# Patient Record
Sex: MALE | Race: WHITE
[De-identification: names, ages, dates, MRNs, and addresses within clinical notes are randomized per-mention and may not be internally consistent; named-entity substitution may affect disease eponyms.]

---

## 2020-07-07 ENCOUNTER — HOSPITAL ENCOUNTER (OUTPATIENT)
Dept: HOSPITAL 92 - BICCT | Age: 75
Discharge: HOME | End: 2020-07-07
Attending: INTERNAL MEDICINE
Payer: COMMERCIAL

## 2020-07-07 DIAGNOSIS — K44.9: ICD-10-CM

## 2020-07-07 DIAGNOSIS — C91.10: Primary | ICD-10-CM

## 2020-07-07 DIAGNOSIS — I70.90: ICD-10-CM

## 2020-07-07 DIAGNOSIS — R59.0: ICD-10-CM

## 2020-07-07 LAB — ESTIMATED GFR-MDRD - POC: 49

## 2020-07-07 PROCEDURE — 82565 ASSAY OF CREATININE: CPT

## 2020-07-07 PROCEDURE — 71260 CT THORAX DX C+: CPT

## 2020-07-07 PROCEDURE — 74177 CT ABD & PELVIS W/CONTRAST: CPT

## 2020-07-07 NOTE — CT
CT chest with IV contrast

CT abdomen and pelvis with IV and oral contrast



HISTORY: Chronic lymphocytic leukemia not having achieved remission. New adenopathy of the axilla and
 groin.



FINDINGS: No comparison available. No parenchymal lung mass, pneumothorax, or focal infiltrate. There
 is calcification within the arterial structures including the coronary arteries. Nonenlarged,

nonspecific lymph nodes are scattered throughout the mediastinum. A small amount of the upper stomach
 lies above the level of the diaphragm, with oral contrast evident in the hiatal hernia and

esophagus.



Throughout each axilla, bulky, partially confluent adenopathy correlates with the clinical history. T
he largest lymph nodes on the right measure up to 4.7 cm greatest diameter and on the left 3.4 cm.

Slightly enlarged lymph nodes are also evident at the base of each side of the neck.



The spleen measures up to 21.3 cm length without focal abnormality. Scattered slightly enlarged lymph
 nodes are present throughout the mesentery, measuring up to 1.2 cm greatest diameter. Enlarged

lymph nodes are also scattered throughout the retroperitoneum, measuring up to 2.1 cm greatest diamet
er at the left common iliac chain.



Larger lymph nodes are present along each external iliac chain, measuring up to 5.4 cm on the right a
nd 3.6 cm on the left. Bulky somewhat confluent adenopathy along each inguinal chain correlates

with the clinical history.



Prostate gland is slightly enlarged, heterogeneous, contains dystrophic calcification, and protrudes 
into the bladder base.



There are degenerative changes throughout the thoracolumbar spine. Most pronounced at the L4-5 level 
where significant central canal stenosis is apparent.



  



IMPRESSION :

Widespread, bulky partially confluent adenopathy throughout the chest, abdomen, and pelvis as detaile
d above. 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



Small hiatal hernia with gastroesophageal reflux.



Atherosclerosis.







Reported By: BENEDICTO Jj 

Electronically Signed:  7/7/2020 10:47 AM

## 2020-08-30 ENCOUNTER — HOSPITAL ENCOUNTER (EMERGENCY)
Dept: HOSPITAL 92 - ERS | Age: 75
Discharge: HOME | End: 2020-08-30
Payer: COMMERCIAL

## 2020-08-30 DIAGNOSIS — R10.13: Primary | ICD-10-CM

## 2020-08-30 DIAGNOSIS — R10.12: ICD-10-CM

## 2020-08-30 DIAGNOSIS — R10.11: ICD-10-CM

## 2020-08-30 LAB
ALBUMIN SERPL BCG-MCNC: 4.3 G/DL (ref 3.4–4.8)
ALP SERPL-CCNC: 104 U/L (ref 40–110)
ALT SERPL W P-5'-P-CCNC: 17 U/L (ref 8–55)
ANION GAP SERPL CALC-SCNC: 15 MMOL/L (ref 10–20)
AST SERPL-CCNC: 22 U/L (ref 5–34)
BILIRUB SERPL-MCNC: 0.3 MG/DL (ref 0.2–1.2)
BUN SERPL-MCNC: 20 MG/DL (ref 8.4–25.7)
CALCIUM SERPL-MCNC: 9.3 MG/DL (ref 7.8–10.44)
CHLORIDE SERPL-SCNC: 107 MMOL/L (ref 98–107)
CO2 SERPL-SCNC: 22 MMOL/L (ref 23–31)
CREAT CL PREDICTED SERPL C-G-VRATE: 0 ML/MIN (ref 70–130)
GLOBULIN SER CALC-MCNC: 2.6 G/DL (ref 2.4–3.5)
GLUCOSE SERPL-MCNC: 128 MG/DL (ref 83–110)
HGB BLD-MCNC: 11.4 G/DL (ref 14–18)
LIPASE SERPL-CCNC: 28 U/L (ref 8–78)
MCH RBC QN AUTO: 26.5 PG (ref 27–31)
MCV RBC AUTO: 93.9 FL (ref 78–98)
MDIFF COMPLETE?: YES
PLATELET # BLD AUTO: 205 THOU/UL (ref 130–400)
POTASSIUM SERPL-SCNC: 4.8 MMOL/L (ref 3.5–5.1)
RBC # BLD AUTO: 4.31 MILL/UL (ref 4.7–6.1)
SODIUM SERPL-SCNC: 139 MMOL/L (ref 136–145)
SP GR UR STRIP: 1.02 (ref 1–1.04)
WBC # BLD AUTO: 242 THOU/UL (ref 4.8–10.8)

## 2020-08-30 PROCEDURE — 96374 THER/PROPH/DIAG INJ IV PUSH: CPT

## 2020-08-30 PROCEDURE — 36415 COLL VENOUS BLD VENIPUNCTURE: CPT

## 2020-08-30 PROCEDURE — 81003 URINALYSIS AUTO W/O SCOPE: CPT

## 2020-08-30 PROCEDURE — 96375 TX/PRO/DX INJ NEW DRUG ADDON: CPT

## 2020-08-30 PROCEDURE — 93005 ELECTROCARDIOGRAM TRACING: CPT

## 2020-08-30 PROCEDURE — 85025 COMPLETE CBC W/AUTO DIFF WBC: CPT

## 2020-08-30 PROCEDURE — 80053 COMPREHEN METABOLIC PANEL: CPT

## 2020-08-30 PROCEDURE — 83690 ASSAY OF LIPASE: CPT

## 2020-08-30 PROCEDURE — 96361 HYDRATE IV INFUSION ADD-ON: CPT

## 2020-08-30 PROCEDURE — 74177 CT ABD & PELVIS W/CONTRAST: CPT

## 2020-08-30 PROCEDURE — 84484 ASSAY OF TROPONIN QUANT: CPT

## 2020-08-30 NOTE — CT
CT ABDOMEN AND PELVIS WITH IV CONTRAST:

 

Date:  08/30/2020

 

HISTORY:  

Abdominal pain. 

 

Comparison made to recent CT abdomen and pelvis of 07/07/2020. That exam described diffuse abdominal 
adenopathy with splenomegaly concerning for lymphoma. 

 

FINDINGS:

Lung bases clear. 

 

The liver and pancreas are unremarkable. Splenomegaly again noted. 

 

Adrenal glands and kidneys unremarkable. 

 

Small and large bowel loops appear unremarkable. Appendix is normal. 

 

The abdominal adenopathy is again noted with periaortic adenopathy seen in the mid abdomen. 

 

Prostatic hypertrophy is again noted. 

 

No evidence of interval change. 

 

IMPRESSION: 

1.  Splenomegaly. 

2.  Retroperitoneal adenopathy. 

3.  Prostatic hypertrophy. 

 

Findings stable from 07/07/2020. 

 

 

POS: [facility]

## 2023-10-06 ENCOUNTER — HOSPITAL ENCOUNTER (OUTPATIENT)
Dept: HOSPITAL 92 - CT | Age: 78
Discharge: HOME | End: 2023-10-06
Attending: FAMILY MEDICINE
Payer: COMMERCIAL

## 2023-10-06 DIAGNOSIS — R16.1: ICD-10-CM

## 2023-10-06 DIAGNOSIS — N04.9: ICD-10-CM

## 2023-10-06 DIAGNOSIS — K80.11: ICD-10-CM

## 2023-10-06 DIAGNOSIS — R59.0: ICD-10-CM

## 2023-10-06 DIAGNOSIS — N40.0: ICD-10-CM

## 2023-10-06 DIAGNOSIS — R31.0: Primary | ICD-10-CM

## 2023-10-06 DIAGNOSIS — M48.061: ICD-10-CM

## 2023-10-06 DIAGNOSIS — N28.89: ICD-10-CM

## 2023-10-06 PROCEDURE — 74178 CT ABD&PLV WO CNTR FLWD CNTR: CPT

## 2023-10-21 ENCOUNTER — HOSPITAL ENCOUNTER (INPATIENT)
Dept: HOSPITAL 92 - ERS | Age: 78
LOS: 2 days | Discharge: HOME | DRG: 418 | End: 2023-10-23
Attending: SURGERY | Admitting: SURGERY
Payer: COMMERCIAL

## 2023-10-21 VITALS — BODY MASS INDEX: 24.3 KG/M2

## 2023-10-21 DIAGNOSIS — E78.5: ICD-10-CM

## 2023-10-21 DIAGNOSIS — I45.10: ICD-10-CM

## 2023-10-21 DIAGNOSIS — I10: ICD-10-CM

## 2023-10-21 DIAGNOSIS — K80.12: Primary | ICD-10-CM

## 2023-10-21 DIAGNOSIS — Z88.2: ICD-10-CM

## 2023-10-21 DIAGNOSIS — Z88.0: ICD-10-CM

## 2023-10-21 DIAGNOSIS — K82.8: ICD-10-CM

## 2023-10-21 DIAGNOSIS — C91.10: ICD-10-CM

## 2023-10-21 LAB
ALBUMIN SERPL BCG-MCNC: 4 G/DL (ref 3.4–4.8)
ALP SERPL-CCNC: 103 U/L (ref 40–110)
ALT SERPL W P-5'-P-CCNC: 12 U/L (ref 8–55)
ANION GAP SERPL CALC-SCNC: 11 MMOL/L (ref 10–20)
ANISOCYTOSIS BLD QL SMEAR: (no result) HPF (ref 0–5)
AST SERPL-CCNC: 17 U/L (ref 5–34)
BILIRUB SERPL-MCNC: 0.4 MG/DL (ref 0.2–1.2)
BUN SERPL-MCNC: 14 MG/DL (ref 8.4–25.7)
CALCIUM SERPL-MCNC: 8.9 MG/DL (ref 7.8–10.44)
CELLAVISION OPERATOR ID: (no result)
CHLORIDE SERPL-SCNC: 105 MMOL/L (ref 98–107)
CO2 SERPL-SCNC: 26 MMOL/L (ref 23–31)
CREAT CL PREDICTED SERPL C-G-VRATE: 0 ML/MIN (ref 70–130)
DELETE AUTO DIFF??: YES
GLOBULIN SER CALC-MCNC: 2.5 G/DL (ref 2.4–3.5)
GLUCOSE SERPL-MCNC: 127 MG/DL (ref 83–110)
HCT VFR BLD CALC: 39.1 % (ref 42–52)
HGB BLD-MCNC: 12.4 G/DL (ref 14–18)
LIPASE SERPL-CCNC: 21 U/L (ref 8–78)
MACROCYTES BLD QL SMEAR: (no result) HPF (ref 0–5)
MANUAL DIFF??: YES
MCH RBC QN AUTO: 29.7 PG (ref 27–31)
MCV RBC AUTO: 93.5 FL (ref 78–98)
OVALOCYTES BLD QL SMEAR: (no result) HPF (ref 0–1)
PLATELET # BLD AUTO: 233 10X3/UL (ref 130–400)
POLYCHROMASIA BLD QL SMEAR: (no result) HPF (ref 0–2)
POTASSIUM SERPL-SCNC: 4.1 MMOL/L (ref 3.5–5.1)
RBC # BLD AUTO: 4.18 MILL/UL (ref 4.7–6.1)
SMUDGE CELLS BLD QL SMEAR: 7 %
SODIUM SERPL-SCNC: 138 MMOL/L (ref 136–145)
WBC # BLD AUTO: 11.4 10X3/UL (ref 4.8–10.8)

## 2023-10-21 PROCEDURE — 96376 TX/PRO/DX INJ SAME DRUG ADON: CPT

## 2023-10-21 PROCEDURE — 86850 RBC ANTIBODY SCREEN: CPT

## 2023-10-21 PROCEDURE — 76705 ECHO EXAM OF ABDOMEN: CPT

## 2023-10-21 PROCEDURE — 80048 BASIC METABOLIC PNL TOTAL CA: CPT

## 2023-10-21 PROCEDURE — 74177 CT ABD & PELVIS W/CONTRAST: CPT

## 2023-10-21 PROCEDURE — 85730 THROMBOPLASTIN TIME PARTIAL: CPT

## 2023-10-21 PROCEDURE — 84100 ASSAY OF PHOSPHORUS: CPT

## 2023-10-21 PROCEDURE — C1713 ANCHOR/SCREW BN/BN,TIS/BN: HCPCS

## 2023-10-21 PROCEDURE — 80053 COMPREHEN METABOLIC PANEL: CPT

## 2023-10-21 PROCEDURE — 81001 URINALYSIS AUTO W/SCOPE: CPT

## 2023-10-21 PROCEDURE — C1889 IMPLANT/INSERT DEVICE, NOC: HCPCS

## 2023-10-21 PROCEDURE — 85610 PROTHROMBIN TIME: CPT

## 2023-10-21 PROCEDURE — 71045 X-RAY EXAM CHEST 1 VIEW: CPT

## 2023-10-21 PROCEDURE — 96367 TX/PROPH/DG ADDL SEQ IV INF: CPT

## 2023-10-21 PROCEDURE — 83735 ASSAY OF MAGNESIUM: CPT

## 2023-10-21 PROCEDURE — 96365 THER/PROPH/DIAG IV INF INIT: CPT

## 2023-10-21 PROCEDURE — 93005 ELECTROCARDIOGRAM TRACING: CPT

## 2023-10-21 PROCEDURE — S0020 INJECTION, BUPIVICAINE HYDRO: HCPCS

## 2023-10-21 PROCEDURE — 36415 COLL VENOUS BLD VENIPUNCTURE: CPT

## 2023-10-21 PROCEDURE — 80076 HEPATIC FUNCTION PANEL: CPT

## 2023-10-21 PROCEDURE — 85025 COMPLETE CBC W/AUTO DIFF WBC: CPT

## 2023-10-21 PROCEDURE — 83690 ASSAY OF LIPASE: CPT

## 2023-10-21 PROCEDURE — 86900 BLOOD TYPING SEROLOGIC ABO: CPT

## 2023-10-21 PROCEDURE — 88304 TISSUE EXAM BY PATHOLOGIST: CPT

## 2023-10-21 PROCEDURE — 96375 TX/PRO/DX INJ NEW DRUG ADDON: CPT

## 2023-10-21 PROCEDURE — 86901 BLOOD TYPING SEROLOGIC RH(D): CPT

## 2023-10-22 LAB
APTT PPP: 27.9 SEC (ref 22.9–36.1)
CAUTI INDICATIONS FOR CULTURE: (no result)
INR PPP: 1
LEUKOCYTE ESTERASE UR QL STRIP.AUTO: 75 LEU/UL
PROT UR STRIP.AUTO-MCNC: 20 MG/DL
PROTHROMBIN TIME: 13.9 SEC (ref 12–14.7)
SP GR UR STRIP: 1.01 (ref 1–1.04)
WBC UR QL AUTO: (no result) HPF (ref 0–3)

## 2023-10-22 PROCEDURE — 0FT44ZZ RESECTION OF GALLBLADDER, PERCUTANEOUS ENDOSCOPIC APPROACH: ICD-10-PCS | Performed by: SURGERY

## 2023-10-22 PROCEDURE — 3E033XZ INTRODUCTION OF VASOPRESSOR INTO PERIPHERAL VEIN, PERCUTANEOUS APPROACH: ICD-10-PCS | Performed by: SURGERY

## 2023-10-22 RX ADMIN — POTASSIUM CHLORIDE, DEXTROSE MONOHYDRATE AND SODIUM CHLORIDE SCH: 150; 5; 450 INJECTION, SOLUTION INTRAVENOUS at 17:02

## 2023-10-22 RX ADMIN — POTASSIUM CHLORIDE, DEXTROSE MONOHYDRATE AND SODIUM CHLORIDE SCH MLS: 150; 5; 450 INJECTION, SOLUTION INTRAVENOUS at 08:49

## 2023-10-23 VITALS — DIASTOLIC BLOOD PRESSURE: 68 MMHG | SYSTOLIC BLOOD PRESSURE: 99 MMHG | TEMPERATURE: 98 F

## 2023-10-23 LAB
ALBUMIN SERPL BCG-MCNC: 3.3 G/DL (ref 3.4–4.8)
ALP SERPL-CCNC: 98 U/L (ref 40–110)
ALT SERPL W P-5'-P-CCNC: 40 U/L (ref 8–55)
ANION GAP SERPL CALC-SCNC: 15 MMOL/L (ref 10–20)
AST SERPL-CCNC: 28 U/L (ref 5–34)
BASOPHILS # BLD AUTO: 0 THOU/UL (ref 0–0.2)
BASOPHILS NFR BLD AUTO: 0.1 % (ref 0–1)
BILIRUB DIRECT SERPL-MCNC: 0.2 MG/DL (ref 0.1–0.3)
BILIRUB SERPL-MCNC: 0.5 MG/DL (ref 0.2–1.2)
BUN SERPL-MCNC: 16 MG/DL (ref 8.4–25.7)
CALCIUM SERPL-MCNC: 8.8 MG/DL (ref 7.8–10.44)
CHLORIDE SERPL-SCNC: 104 MMOL/L (ref 98–107)
CO2 SERPL-SCNC: 24 MMOL/L (ref 23–31)
CREAT CL PREDICTED SERPL C-G-VRATE: 43 ML/MIN (ref 70–130)
EOSINOPHIL # BLD AUTO: 0 THOU/UL (ref 0–0.7)
EOSINOPHIL NFR BLD AUTO: 0.1 % (ref 0–10)
GLUCOSE SERPL-MCNC: 112 MG/DL (ref 83–110)
HCT VFR BLD CALC: 33 % (ref 42–52)
HGB BLD-MCNC: 10.4 G/DL (ref 14–18)
LYMPHOCYTES NFR BLD AUTO: 21.2 % (ref 21–51)
MAGNESIUM SERPL-MCNC: 2 MG/DL (ref 1.6–2.6)
MCH RBC QN AUTO: 29.1 PG (ref 27–31)
MCV RBC AUTO: 92.4 FL (ref 78–98)
MONOCYTES # BLD AUTO: 1.2 THOU/UL (ref 0.11–0.59)
MONOCYTES NFR BLD AUTO: 10.4 % (ref 0–10)
NEUTROPHILS # BLD AUTO: 8 THOU/UL (ref 1.4–6.5)
NEUTROPHILS NFR BLD AUTO: 67.6 % (ref 42–75)
PLATELET # BLD AUTO: 227 10X3/UL (ref 130–400)
POTASSIUM SERPL-SCNC: 4.3 MMOL/L (ref 3.5–5.1)
RBC # BLD AUTO: 3.57 MILL/UL (ref 4.7–6.1)
SODIUM SERPL-SCNC: 139 MMOL/L (ref 136–145)
WBC # BLD AUTO: 11.8 10X3/UL (ref 4.8–10.8)

## 2024-12-23 ENCOUNTER — HOSPITAL ENCOUNTER (OUTPATIENT)
Dept: HOSPITAL 92 - BICMRI | Age: 79
Discharge: HOME | End: 2024-12-23
Attending: ANESTHESIOLOGY
Payer: COMMERCIAL

## 2024-12-23 DIAGNOSIS — M54.16: Primary | ICD-10-CM

## 2024-12-23 DIAGNOSIS — M43.16: ICD-10-CM

## 2024-12-23 DIAGNOSIS — M48.061: ICD-10-CM

## 2024-12-23 PROCEDURE — 72100 X-RAY EXAM L-S SPINE 2/3 VWS: CPT

## 2024-12-23 PROCEDURE — 72148 MRI LUMBAR SPINE W/O DYE: CPT
